# Patient Record
Sex: MALE | Race: BLACK OR AFRICAN AMERICAN | NOT HISPANIC OR LATINO | Employment: OTHER | URBAN - METROPOLITAN AREA
[De-identification: names, ages, dates, MRNs, and addresses within clinical notes are randomized per-mention and may not be internally consistent; named-entity substitution may affect disease eponyms.]

---

## 2018-12-20 ENCOUNTER — OFFICE VISIT (OUTPATIENT)
Dept: PRIMARY CARE CLINIC | Facility: CLINIC | Age: 53
End: 2018-12-20
Payer: MEDICARE

## 2018-12-20 VITALS
HEART RATE: 61 BPM | HEIGHT: 61 IN | RESPIRATION RATE: 18 BRPM | BODY MASS INDEX: 24.92 KG/M2 | SYSTOLIC BLOOD PRESSURE: 157 MMHG | WEIGHT: 132 LBS | DIASTOLIC BLOOD PRESSURE: 95 MMHG | OXYGEN SATURATION: 97 %

## 2018-12-20 DIAGNOSIS — S46.212D TEAR OF LEFT BICEPS MUSCLE, SUBSEQUENT ENCOUNTER: ICD-10-CM

## 2018-12-20 DIAGNOSIS — Z13.6 ENCOUNTER FOR SCREENING FOR CARDIOVASCULAR DISORDERS: ICD-10-CM

## 2018-12-20 DIAGNOSIS — R56.9 SEIZURE: Primary | ICD-10-CM

## 2018-12-20 DIAGNOSIS — H50.9 STRABISMUS: ICD-10-CM

## 2018-12-20 DIAGNOSIS — Z00.00 ROUTINE GENERAL MEDICAL EXAMINATION AT A HEALTH CARE FACILITY: ICD-10-CM

## 2018-12-20 DIAGNOSIS — K02.9 DENTAL CARIES: ICD-10-CM

## 2018-12-20 DIAGNOSIS — R03.0 BORDERLINE HYPERTENSION: ICD-10-CM

## 2018-12-20 DIAGNOSIS — I10 ESSENTIAL (PRIMARY) HYPERTENSION: ICD-10-CM

## 2018-12-20 PROBLEM — S46.212A TEAR OF LEFT BICEPS MUSCLE: Status: ACTIVE | Noted: 2018-12-20

## 2018-12-20 PROCEDURE — 99214 OFFICE O/P EST MOD 30 MIN: CPT | Mod: S$PBB,,, | Performed by: FAMILY MEDICINE

## 2018-12-20 PROCEDURE — 99203 OFFICE O/P NEW LOW 30 MIN: CPT | Mod: PBBFAC,PN | Performed by: FAMILY MEDICINE

## 2018-12-20 PROCEDURE — 99999 PR PBB SHADOW E&M-NEW PATIENT-LVL III: CPT | Mod: PBBFAC,,, | Performed by: FAMILY MEDICINE

## 2018-12-20 RX ORDER — PHENYTOIN SODIUM 100 MG/1
100 CAPSULE, EXTENDED RELEASE ORAL 3 TIMES DAILY
Qty: 90 CAPSULE | Refills: 6 | Status: SHIPPED | OUTPATIENT
Start: 2018-12-20 | End: 2019-08-28 | Stop reason: SDUPTHER
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
## 2019-08-28 DIAGNOSIS — R03.0 BORDERLINE HYPERTENSION: ICD-10-CM

## 2019-08-28 DIAGNOSIS — R56.9 SEIZURE: ICD-10-CM

## 2019-08-29 RX ORDER — PHENYTOIN SODIUM 100 MG/1
CAPSULE, EXTENDED RELEASE ORAL
Qty: 90 CAPSULE | Refills: 2 | Status: SHIPPED | OUTPATIENT
Start: 2019-08-29 | End: 2019-11-23 | Stop reason: SDUPTHER

## 2019-08-29 NOTE — TELEPHONE ENCOUNTER
Patient should have Dilantin level q.6 months last Dilantin level May 2018 OK 3 months refills give time to come in needs CBCs CMP lipids Dilantin level see me 2 weeks later

## 2019-11-23 DIAGNOSIS — R56.9 SEIZURE: ICD-10-CM

## 2019-11-23 DIAGNOSIS — R03.0 BORDERLINE HYPERTENSION: ICD-10-CM

## 2019-12-02 RX ORDER — PHENYTOIN SODIUM 100 MG/1
CAPSULE, EXTENDED RELEASE ORAL
Qty: 90 CAPSULE | Refills: 0 | Status: SHIPPED | OUTPATIENT
Start: 2019-12-02 | End: 2020-06-20

## 2019-12-03 NOTE — TELEPHONE ENCOUNTER
Call tell patient last lab done May 2018 for Dilantin level other labs last done December 2018 patient needs to come in fasting get lab CBCs CMP lipids T4 TSH stool guaiac UA Dilantin level if over 2-3 years needs to add chest x-ray EKG OK 3 months of Dilantin give time to come and get lab get seen and get additional refills no more refills without getting lab in being seen

## 2020-02-20 DIAGNOSIS — Z12.11 COLON CANCER SCREENING: ICD-10-CM

## 2020-06-22 ENCOUNTER — TELEPHONE (OUTPATIENT)
Dept: PRIMARY CARE CLINIC | Facility: CLINIC | Age: 55
End: 2020-06-22

## 2020-09-24 DIAGNOSIS — R03.0 BORDERLINE HYPERTENSION: ICD-10-CM

## 2020-09-24 DIAGNOSIS — R56.9 SEIZURE: ICD-10-CM

## 2020-09-24 RX ORDER — PHENYTOIN SODIUM 100 MG/1
CAPSULE, EXTENDED RELEASE ORAL
Qty: 90 CAPSULE | Refills: 0 | Status: SHIPPED | OUTPATIENT
Start: 2020-09-24 | End: 2020-12-17 | Stop reason: ALTCHOICE

## 2020-09-25 NOTE — TELEPHONE ENCOUNTER
John pt not seen since  pt n come in fasting get lab CBC,CMP,lipid T4,TSH Dilantin level OK to overbook me or asign to Dr Chen to be seen right away if needs refills Needs come in fasting get lab get seen get refills OK 1 month refills give time comein gt seen getadditional refills No more refills until seen and refills done

## 2021-09-06 PROBLEM — F10.10 ALCOHOL ABUSE: Status: ACTIVE | Noted: 2021-09-06

## 2021-09-06 PROBLEM — R29.90 STROKE-LIKE SYMPTOMS: Status: ACTIVE | Noted: 2021-09-06

## 2021-09-06 PROBLEM — I10 HTN (HYPERTENSION): Status: ACTIVE | Noted: 2021-09-06

## 2021-09-07 PROBLEM — Z86.73 OLD LACUNAR STROKE WITHOUT LATE EFFECT: Status: ACTIVE | Noted: 2021-09-07

## 2021-09-07 PROBLEM — R29.90 STROKE-LIKE SYMPTOMS: Status: RESOLVED | Noted: 2021-09-06 | Resolved: 2021-09-07

## 2021-09-09 PROCEDURE — G0180 MD CERTIFICATION HHA PATIENT: HCPCS | Mod: ,,, | Performed by: FAMILY MEDICINE

## 2021-09-09 PROCEDURE — G0180 PR HOME HEALTH MD CERTIFICATION: ICD-10-PCS | Mod: ,,, | Performed by: FAMILY MEDICINE

## 2021-09-22 ENCOUNTER — TELEPHONE (OUTPATIENT)
Dept: PRIMARY CARE CLINIC | Facility: CLINIC | Age: 56
End: 2021-09-22

## 2021-10-06 ENCOUNTER — TELEPHONE (OUTPATIENT)
Dept: PRIMARY CARE CLINIC | Facility: CLINIC | Age: 56
End: 2021-10-06

## 2021-10-07 ENCOUNTER — TELEPHONE (OUTPATIENT)
Dept: PRIMARY CARE CLINIC | Facility: CLINIC | Age: 56
End: 2021-10-07

## 2021-10-08 ENCOUNTER — EXTERNAL HOME HEALTH (OUTPATIENT)
Dept: HOME HEALTH SERVICES | Facility: HOSPITAL | Age: 56
End: 2021-10-08
Payer: MEDICARE

## 2021-10-11 ENCOUNTER — TELEPHONE (OUTPATIENT)
Dept: NEUROLOGY | Facility: CLINIC | Age: 56
End: 2021-10-11

## 2021-10-11 ENCOUNTER — OFFICE VISIT (OUTPATIENT)
Dept: PRIMARY CARE CLINIC | Facility: CLINIC | Age: 56
End: 2021-10-11
Payer: MEDICARE

## 2021-10-11 VITALS
WEIGHT: 127.44 LBS | BODY MASS INDEX: 24.06 KG/M2 | RESPIRATION RATE: 18 BRPM | HEIGHT: 61 IN | HEART RATE: 72 BPM | SYSTOLIC BLOOD PRESSURE: 118 MMHG | DIASTOLIC BLOOD PRESSURE: 86 MMHG | OXYGEN SATURATION: 98 %

## 2021-10-11 DIAGNOSIS — R56.9 SEIZURES: ICD-10-CM

## 2021-10-11 DIAGNOSIS — M94.9 DISORDER OF CARTILAGE, UNSPECIFIED: ICD-10-CM

## 2021-10-11 DIAGNOSIS — Z86.73 OLD CEREBROVASCULAR ACCIDENT (CVA) WITHOUT LATE EFFECT: ICD-10-CM

## 2021-10-11 DIAGNOSIS — H25.9 AGE-RELATED CATARACT OF BOTH EYES, UNSPECIFIED AGE-RELATED CATARACT TYPE: Primary | ICD-10-CM

## 2021-10-11 DIAGNOSIS — K08.9 POOR DENTITION: ICD-10-CM

## 2021-10-11 DIAGNOSIS — E78.5 HYPERLIPIDEMIA, UNSPECIFIED HYPERLIPIDEMIA TYPE: ICD-10-CM

## 2021-10-11 DIAGNOSIS — I10 HYPERTENSION, UNSPECIFIED TYPE: ICD-10-CM

## 2021-10-11 DIAGNOSIS — Z72.0 TOBACCO ABUSE: ICD-10-CM

## 2021-10-11 PROCEDURE — 99214 OFFICE O/P EST MOD 30 MIN: CPT | Mod: S$PBB,,, | Performed by: FAMILY MEDICINE

## 2021-10-11 PROCEDURE — 99999 PR PBB SHADOW E&M-EST. PATIENT-LVL IV: ICD-10-PCS | Mod: PBBFAC,,, | Performed by: FAMILY MEDICINE

## 2021-10-11 PROCEDURE — 99214 PR OFFICE/OUTPT VISIT, EST, LEVL IV, 30-39 MIN: ICD-10-PCS | Mod: S$PBB,,, | Performed by: FAMILY MEDICINE

## 2021-10-11 PROCEDURE — 99214 OFFICE O/P EST MOD 30 MIN: CPT | Mod: PBBFAC,PN | Performed by: FAMILY MEDICINE

## 2021-10-11 PROCEDURE — 99999 PR PBB SHADOW E&M-EST. PATIENT-LVL IV: CPT | Mod: PBBFAC,,, | Performed by: FAMILY MEDICINE

## 2021-10-12 RX ORDER — PHENYTOIN SODIUM 100 MG/1
CAPSULE, EXTENDED RELEASE ORAL
Qty: 90 CAPSULE | Refills: 5 | Status: SHIPPED | OUTPATIENT
Start: 2021-10-12

## 2021-10-15 ENCOUNTER — TELEPHONE (OUTPATIENT)
Dept: PRIMARY CARE CLINIC | Facility: CLINIC | Age: 56
End: 2021-10-15

## 2021-11-08 PROCEDURE — G0179 MD RECERTIFICATION HHA PT: HCPCS | Mod: ,,, | Performed by: FAMILY MEDICINE

## 2021-11-08 PROCEDURE — G0179 PR HOME HEALTH MD RECERTIFICATION: ICD-10-PCS | Mod: ,,, | Performed by: FAMILY MEDICINE

## 2021-11-09 ENCOUNTER — EXTERNAL HOME HEALTH (OUTPATIENT)
Dept: HOME HEALTH SERVICES | Facility: HOSPITAL | Age: 56
End: 2021-11-09
Payer: MEDICARE

## 2021-12-10 ENCOUNTER — DOCUMENT SCAN (OUTPATIENT)
Dept: HOME HEALTH SERVICES | Facility: HOSPITAL | Age: 56
End: 2021-12-10
Payer: MEDICARE

## 2021-12-21 ENCOUNTER — DOCUMENT SCAN (OUTPATIENT)
Dept: HOME HEALTH SERVICES | Facility: HOSPITAL | Age: 56
End: 2021-12-21
Payer: MEDICARE

## 2021-12-21 ENCOUNTER — TELEPHONE (OUTPATIENT)
Dept: PRIMARY CARE CLINIC | Facility: CLINIC | Age: 56
End: 2021-12-21
Payer: MEDICARE

## 2021-12-22 RX ORDER — AMLODIPINE BESYLATE 2.5 MG/1
TABLET ORAL
Qty: 30 TABLET | Refills: 11 | Status: SHIPPED | OUTPATIENT
Start: 2021-12-22 | End: 2022-03-03 | Stop reason: SDUPTHER

## 2022-01-07 PROCEDURE — G0179 MD RECERTIFICATION HHA PT: HCPCS | Mod: ,,, | Performed by: FAMILY MEDICINE

## 2022-01-07 PROCEDURE — G0179 PR HOME HEALTH MD RECERTIFICATION: ICD-10-PCS | Mod: ,,, | Performed by: FAMILY MEDICINE

## 2022-01-26 ENCOUNTER — EXTERNAL HOME HEALTH (OUTPATIENT)
Dept: HOME HEALTH SERVICES | Facility: HOSPITAL | Age: 57
End: 2022-01-26
Payer: MEDICARE

## 2022-03-03 NOTE — TELEPHONE ENCOUNTER
----- Message from Nidia Ross sent at 3/3/2022 12:51 PM CST -----  Contact: Eugenia w/  [phone]  Eugenia called to report that patient's b/p reading is 140/99 and heart rate is 70. Patient denied complaints at this time. Eugenia states [facility] never received Rx for Amlodipine. Please call and advise

## 2022-03-03 NOTE — TELEPHONE ENCOUNTER
No new care gaps identified.  Powered by [employer]. Reference number: [id number].   3/03/2022 12:59:54 PM CST

## 2022-03-05 RX ORDER — AMLODIPINE BESYLATE 2.5 MG/1
TABLET ORAL
Qty: 30 TABLET | Refills: 5 | Status: SHIPPED | OUTPATIENT
Start: 2022-03-05 | End: 2022-03-23

## 2022-03-08 PROCEDURE — G0179 PR HOME HEALTH MD RECERTIFICATION: ICD-10-PCS | Mod: ,,, | Performed by: FAMILY MEDICINE

## 2022-03-08 PROCEDURE — G0179 MD RECERTIFICATION HHA PT: HCPCS | Mod: ,,, | Performed by: FAMILY MEDICINE

## 2022-03-11 ENCOUNTER — EXTERNAL HOME HEALTH (OUTPATIENT)
Dept: HOME HEALTH SERVICES | Facility: HOSPITAL | Age: 57
End: 2022-03-11
Payer: MEDICARE

## 2022-03-14 DIAGNOSIS — Z12.11 COLON CANCER SCREENING: ICD-10-CM
